# Patient Record
Sex: MALE | Race: WHITE | Employment: STUDENT | URBAN - METROPOLITAN AREA
[De-identification: names, ages, dates, MRNs, and addresses within clinical notes are randomized per-mention and may not be internally consistent; named-entity substitution may affect disease eponyms.]

---

## 2023-03-05 ENCOUNTER — OFFICE VISIT (OUTPATIENT)
Dept: URGENT CARE | Age: 18
End: 2023-03-05

## 2023-03-05 VITALS
HEART RATE: 71 BPM | TEMPERATURE: 98.5 F | SYSTOLIC BLOOD PRESSURE: 117 MMHG | RESPIRATION RATE: 18 BRPM | DIASTOLIC BLOOD PRESSURE: 75 MMHG | OXYGEN SATURATION: 98 %

## 2023-03-05 DIAGNOSIS — R21 RASH: Primary | ICD-10-CM

## 2023-03-05 RX ORDER — CLOTRIMAZOLE AND BETAMETHASONE DIPROPIONATE 10; .64 MG/G; MG/G
CREAM TOPICAL 2 TIMES DAILY
Qty: 30 G | Refills: 0 | Status: SHIPPED | OUTPATIENT
Start: 2023-03-05

## 2023-03-05 NOTE — PROGRESS NOTES
[id number]        NAME: Rudy Pedersen is a 21 y o  male  : 2005    MRN: [id number]  DATE: 2023  TIME: 5:30 PM    Assessment and Plan   Rash [R21]  1  Rash  clotrimazole-betamethasone (LOTRISONE) 1-0 05 % cream            Patient Instructions       Follow up with PCP in 3-5 days  Proceed to  ER if symptoms worsen  Chief Complaint     Chief Complaint   Patient presents with   • Rash     Patient states about a month ago he noticed a small rash on his left upper thigh around his groin  He states that for the first month that he noticed them they spread around his groin area on just the left side and then stayed stable  He states no pain itching or irritation  Notes that they are not raised or draining  History of Present Illness       HPI  Patient presents complaining of rash in his left upper groin area ongoing for about a month  He initially noticed a small rash, now is getting more raised and is spreading  Patient does not have any itching or pain  Irritation  He notes they are not raised or draining either  Review of Systems   Review of Systems  Per HPI    Current Medications       Current Outpatient Medications:   •  clotrimazole-betamethasone (LOTRISONE) 1-0 05 % cream, Apply topically 2 (two) times a day, Disp: 30 g, Rfl: 0    Current Allergies     Allergies as of 2023   • (No Known Allergies)            The following portions of the patient's history were reviewed and updated as appropriate: allergies, current medications, past family history, past medical history, past social history, past surgical history and problem list      No past medical history on file  No past surgical history on file  No family history on file  Medications have been verified  Objective   /75   Pulse 71   Temp 98 5 °F (36 9 °C)   Resp 18   SpO2 98%   No LMP for male patient         Physical Exam     Physical Exam  Constitutional:       General: He is not in acute distress  Appearance: He is well-developed  He is not diaphoretic  HENT:      Head: Normocephalic and atraumatic  Cardiovascular:      Rate and Rhythm: Normal rate and regular rhythm  Heart sounds: Normal heart sounds  Pulmonary:      Effort: Pulmonary effort is normal  No respiratory distress  Breath sounds: Normal breath sounds  No wheezing  Genitourinary:     Comments: Scaly rash present left upper groin  Neurological:      Mental Status: He is alert and oriented to person, place, and time

## 2024-04-09 ENCOUNTER — TELEPHONE (OUTPATIENT)
Dept: UROLOGY | Facility: AMBULATORY SURGERY CENTER | Age: 19
End: 2024-04-09

## 2024-04-09 NOTE — TELEPHONE ENCOUNTER
Called the patient and left a VM to inform him the appointment with scheduled with Dr. Aldridge on 4/12  appointment has been cancelled and will need to be rescheduled with [facility] due to schedule shuffling, next available. Please call us at [phone], thank-you.